# Patient Record
Sex: FEMALE | Race: WHITE | NOT HISPANIC OR LATINO | URBAN - METROPOLITAN AREA
[De-identification: names, ages, dates, MRNs, and addresses within clinical notes are randomized per-mention and may not be internally consistent; named-entity substitution may affect disease eponyms.]

---

## 2018-10-09 ENCOUNTER — NEW PATIENT (OUTPATIENT)
Dept: URBAN - METROPOLITAN AREA CLINIC 10 | Facility: CLINIC | Age: 58
End: 2018-10-09
Payer: COMMERCIAL

## 2018-10-09 DIAGNOSIS — H16.223 KERATOCONJUNCTIVITIS SICCA, BILATERAL: ICD-10-CM

## 2018-10-09 DIAGNOSIS — H02.055 TRICHIASIS WITHOUT ENTROPION, LEFT LOWER LID: ICD-10-CM

## 2018-10-09 DIAGNOSIS — H52.4 PRESBYOPIA: ICD-10-CM

## 2018-10-09 DIAGNOSIS — H04.223 EPIPHORA DUE TO INSUFFICIENT DRAINAGE, BILATERAL: Primary | ICD-10-CM

## 2018-10-09 DIAGNOSIS — H02.052 TRICHIASIS WITHOUT ENTROPION, RIGHT LOWER LID: ICD-10-CM

## 2018-10-09 PROCEDURE — 92004 COMPRE OPH EXAM NEW PT 1/>: CPT | Performed by: OPTOMETRIST

## 2018-10-09 PROCEDURE — 92015 DETERMINE REFRACTIVE STATE: CPT | Performed by: OPTOMETRIST

## 2018-10-09 PROCEDURE — 67820 REVISE EYELASHES: CPT | Performed by: OPTOMETRIST

## 2018-10-09 RX ORDER — FLUOROMETHOLONE 1 MG/ML
0.1 % SOLUTION/ DROPS OPHTHALMIC
Qty: 1 | Refills: 1 | Status: INACTIVE
Start: 2018-10-09 | End: 2018-10-30

## 2018-10-09 ASSESSMENT — KERATOMETRY
OS: 47.88
OD: 48.27

## 2018-10-09 ASSESSMENT — INTRAOCULAR PRESSURE
OS: 18
OD: 17

## 2018-10-09 ASSESSMENT — VISUAL ACUITY
OS: 20/20
OD: 20/20

## 2018-10-30 ENCOUNTER — FOLLOW UP ESTABLISHED (OUTPATIENT)
Dept: URBAN - METROPOLITAN AREA CLINIC 10 | Facility: CLINIC | Age: 58
End: 2018-10-30
Payer: COMMERCIAL

## 2018-10-30 DIAGNOSIS — H04.221 EPIPHORA DUE TO INSUFFICIENT DRAINAGE, RIGHT EYE: Primary | ICD-10-CM

## 2018-10-30 PROCEDURE — 68801 DILATE TEAR DUCT OPENING: CPT | Performed by: OPHTHALMOLOGY

## 2018-10-30 PROCEDURE — 68840 EXPLORE/IRRIGATE TEAR DUCTS: CPT | Performed by: OPHTHALMOLOGY

## 2018-10-30 PROCEDURE — 92285 EXTERNAL OCULAR PHOTOGRAPHY: CPT | Performed by: OPHTHALMOLOGY

## 2018-10-30 PROCEDURE — 99203 OFFICE O/P NEW LOW 30 MIN: CPT | Performed by: OPHTHALMOLOGY

## 2018-11-07 ENCOUNTER — FOLLOW UP ESTABLISHED (OUTPATIENT)
Dept: URBAN - METROPOLITAN AREA CLINIC 10 | Facility: CLINIC | Age: 58
End: 2018-11-07
Payer: COMMERCIAL

## 2018-11-07 DIAGNOSIS — H04.561 STENOSIS OF RIGHT LACRIMAL PUNCTUM: Primary | ICD-10-CM

## 2018-11-07 PROCEDURE — 92012 INTRM OPH EXAM EST PATIENT: CPT | Performed by: OPHTHALMOLOGY

## 2018-11-07 PROCEDURE — 92285 EXTERNAL OCULAR PHOTOGRAPHY: CPT | Performed by: OPHTHALMOLOGY

## 2018-11-07 PROCEDURE — 68440 SNIP INC LACRIMAL PUNCTUM: CPT | Performed by: OPHTHALMOLOGY

## 2018-11-07 RX ORDER — NEOMYCIN SULFATE, POLYMYXIN B SULFATE AND DEXAMETHASONE 3.5; 10000; 1 MG/ML; [USP'U]/ML; MG/ML
SUSPENSION OPHTHALMIC
Qty: 1 | Refills: 1 | Status: ACTIVE
Start: 2018-11-07

## 2023-01-26 ENCOUNTER — OFFICE VISIT (OUTPATIENT)
Dept: URBAN - METROPOLITAN AREA CLINIC 10 | Facility: CLINIC | Age: 63
End: 2023-01-26
Payer: COMMERCIAL

## 2023-01-26 DIAGNOSIS — H35.3131 NONEXUDATIVE MACULAR DEGENERATION, EARLY DRY STAGE, BILATERAL: ICD-10-CM

## 2023-01-26 DIAGNOSIS — H16.223 KERATOCONJUNCTIVITIS SICCA, BILATERAL: ICD-10-CM

## 2023-01-26 DIAGNOSIS — H25.13 AGE-RELATED NUCLEAR CATARACT, BILATERAL: ICD-10-CM

## 2023-01-26 DIAGNOSIS — H04.223 EPIPHORA DUE TO INSUFFICIENT DRAINAGE, BILATERAL: Primary | ICD-10-CM

## 2023-01-26 PROCEDURE — 92134 CPTRZ OPH DX IMG PST SGM RTA: CPT | Performed by: STUDENT IN AN ORGANIZED HEALTH CARE EDUCATION/TRAINING PROGRAM

## 2023-01-26 PROCEDURE — 99204 OFFICE O/P NEW MOD 45 MIN: CPT | Performed by: STUDENT IN AN ORGANIZED HEALTH CARE EDUCATION/TRAINING PROGRAM

## 2023-01-26 RX ORDER — PREDNISOLONE ACETATE 10 MG/ML
1 % SUSPENSION/ DROPS OPHTHALMIC
Qty: 10 | Refills: 1 | Status: ACTIVE
Start: 2023-01-26

## 2023-01-26 ASSESSMENT — INTRAOCULAR PRESSURE
OS: 17
OD: 17

## 2023-01-26 ASSESSMENT — VISUAL ACUITY
OD: 20/20
OS: 20/20

## 2023-01-26 NOTE — IMPRESSION/PLAN
Impression: Nonexudative macular degeneration, early dry stage, bilateral: H35.3131. Plan: Patient educated on condition, Mac OCT ordered today is stable at today's visit with no breaks. Educated patient on importance of monitoring with [employer] grid at home and to continue taking [employer]'s vitamin, wearing UV protection, and adding leafy greens to diet. RTC 1 year for [facility] and Mac OCT.

## 2023-01-26 NOTE — IMPRESSION/PLAN
Impression: Keratoconjunctivitis sicca, bilateral: X62.773. Plan: Patient educated on signs and symptoms of dry eye and importance of environmental factors. Discussed using OTC AT's with patient QID OU long-term, Gel drop QHS OU, and  warm compresses BID 5-10min OU long-term. Educated patient if signs or symptoms worsen to RTC for dry eye work-up. Start Pred forte QID OU x 1 week then TID OU x 1 week then BID OU x 1 week then QD OU x 1 week then d/c

## 2023-01-26 NOTE — IMPRESSION/PLAN
Impression: Epiphora due to insufficient drainage, bilateral: H93.510. Plan: Discussed findings, s/p punctoplasty OD. Patient reports AT's only making worse.  Referred to [facility] for eval

## 2023-02-02 ENCOUNTER — OFFICE VISIT (OUTPATIENT)
Dept: URBAN - METROPOLITAN AREA CLINIC 51 | Facility: CLINIC | Age: 63
End: 2023-02-02
Payer: MEDICARE

## 2023-02-02 DIAGNOSIS — H04.223 EPIPHORA DUE TO INSUFFICIENT DRAINAGE, BILATERAL: Primary | ICD-10-CM

## 2023-02-02 DIAGNOSIS — H04.551 ACQUIRED STENOSIS OF RIGHT NASOLACRIMAL DUCT: ICD-10-CM

## 2023-02-02 PROCEDURE — 99204 OFFICE O/P NEW MOD 45 MIN: CPT | Performed by: STUDENT IN AN ORGANIZED HEALTH CARE EDUCATION/TRAINING PROGRAM

## 2023-02-02 PROCEDURE — 92285 EXTERNAL OCULAR PHOTOGRAPHY: CPT | Performed by: STUDENT IN AN ORGANIZED HEALTH CARE EDUCATION/TRAINING PROGRAM

## 2023-02-02 PROCEDURE — 68801 DILATE TEAR DUCT OPENING: CPT | Performed by: STUDENT IN AN ORGANIZED HEALTH CARE EDUCATION/TRAINING PROGRAM

## 2023-02-02 NOTE — IMPRESSION/PLAN
Impression: Acquired stenosis of right nasolacrimal duct: H04.551. Plan: Examination and irrigation of the lacrimal system revealed a partial obstruction of the pt's RIGHT nasolacrimal duct. I recommend the pt undergo a dacryocystorhinostomy/ Anterior Ethmoidectomy with Lacrimal Sac Biopsy and placement of lacrimal stent to resolve the tearing problem, as well as prevent the likelihood of dacryocystitis in the future. We discussed some of the technical aspects of the procedure, as well as the r/b/a's. Patient hesitant to have scar on lower eyelid. Encouraged her to seek opinion from ENT who can perform endoscopic DCR without skin incision. Patient noticed improvement with steroid eye drops. Discussed option of using steroid drops intermittently when having tearing (no more than 2-3 weeks at a time,  by long periods without steroid drops) to relieve partial obstruction. Patient will think about it and call back if she wishes to proceed with DCR, right side.

## 2023-02-02 NOTE — IMPRESSION/PLAN
Impression: Epiphora due to insufficient drainage, bilateral: X15.407. Plan: Irrigated BLL puncta, Right puncta Reflux 50%, NL Outflow 50%, Left puncta Reflux 20%, NL Outflow 80%. See plan. S/p punctoplasty RLL by Dr. Joanna Lam 2018. Noticed improvement in tearing following punctoplasty. Started having tearing (right>left) approx 1 year ago. Was started on topical steroid drops and noticed significant improvement. Irrigation today reveals partial NLDO OD>OS. Discussed irrigation and/or steroid drops are occasionally adequate to treat partial obstruction. If no improvement, can consider DCR surgery right side.   (see NLDO)

## 2023-04-06 ENCOUNTER — OFFICE VISIT (OUTPATIENT)
Dept: URBAN - METROPOLITAN AREA CLINIC 32 | Facility: CLINIC | Age: 63
End: 2023-04-06
Payer: MEDICARE

## 2023-04-06 DIAGNOSIS — H04.223 EPIPHORA DUE TO INSUFFICIENT DRAINAGE, BILATERAL: Primary | ICD-10-CM

## 2023-04-06 PROCEDURE — 99203 OFFICE O/P NEW LOW 30 MIN: CPT | Performed by: OPHTHALMOLOGY

## 2023-04-06 PROCEDURE — 68840 EXPLORE/IRRIGATE TEAR DUCTS: CPT | Performed by: OPHTHALMOLOGY

## 2023-04-06 ASSESSMENT — INTRAOCULAR PRESSURE
OS: 17
OD: 17

## 2023-04-06 NOTE — IMPRESSION/PLAN
Impression: Epiphora due to insufficient drainage, bilateral: G94.308. Bilateral. Condition: established, stable. Symptoms: will continue to monitor. Vision: vision not affected. Plan: Discussed diagnosis in detail with patient. Discussed treatment options with patient. Irrigation is patent on both sides, no NLDO OU. New medication(s) Rx given today, use flonase to both nostril once or twice daily for comfort. Surgery not indicated, will monitor. none

## 2024-09-11 ENCOUNTER — OFFICE VISIT (OUTPATIENT)
Dept: URBAN - METROPOLITAN AREA CLINIC 43 | Facility: CLINIC | Age: 64
End: 2024-09-11
Payer: MEDICARE

## 2024-09-11 DIAGNOSIS — H25.13 AGE-RELATED NUCLEAR CATARACT, BILATERAL: ICD-10-CM

## 2024-09-11 DIAGNOSIS — H04.562 STENOSIS OF LEFT LACRIMAL PUNCTUM: ICD-10-CM

## 2024-09-11 DIAGNOSIS — H35.3131 NONEXUDATIVE AGE-RELATED MACULAR DEGENERATION, BILATERAL, EARLY DRY STAGE: Primary | ICD-10-CM

## 2024-09-11 DIAGNOSIS — H04.123 TEAR FILM INSUFFICIENCY OF BILATERAL LACRIMAL GLANDS: ICD-10-CM

## 2024-09-11 PROCEDURE — 92134 CPTRZ OPH DX IMG PST SGM RTA: CPT | Performed by: OPHTHALMOLOGY

## 2024-09-11 PROCEDURE — 92014 COMPRE OPH EXAM EST PT 1/>: CPT | Performed by: OPHTHALMOLOGY

## 2024-09-11 RX ORDER — KETOROLAC TROMETHAMINE 4 MG/ML
0.4 % SOLUTION/ DROPS OPHTHALMIC
Qty: 5 | Refills: 2 | Status: ACTIVE
Start: 2024-09-11

## 2024-09-11 ASSESSMENT — VISUAL ACUITY
OD: 20/20
OS: 20/20

## 2024-09-11 ASSESSMENT — KERATOMETRY
OS: 47.63
OD: 47.25

## 2024-09-11 ASSESSMENT — INTRAOCULAR PRESSURE
OD: 14
OS: 14

## 2024-10-28 ENCOUNTER — OFFICE VISIT (OUTPATIENT)
Dept: URBAN - METROPOLITAN AREA CLINIC 43 | Facility: CLINIC | Age: 64
End: 2024-10-28
Payer: MEDICARE

## 2024-10-28 DIAGNOSIS — H04.123 TEAR FILM INSUFFICIENCY OF BILATERAL LACRIMAL GLANDS: Primary | ICD-10-CM

## 2024-10-28 DIAGNOSIS — H04.562 STENOSIS OF LEFT LACRIMAL PUNCTUM: ICD-10-CM

## 2024-10-28 PROCEDURE — 92012 INTRM OPH EXAM EST PATIENT: CPT | Performed by: OPHTHALMOLOGY

## 2024-10-28 RX ORDER — PREDNISOLONE ACETATE 10 MG/ML
1 % SUSPENSION/ DROPS OPHTHALMIC
Qty: 10 | Refills: 1 | Status: ACTIVE
Start: 2024-10-28